# Patient Record
Sex: FEMALE | Race: WHITE | NOT HISPANIC OR LATINO | URBAN - METROPOLITAN AREA
[De-identification: names, ages, dates, MRNs, and addresses within clinical notes are randomized per-mention and may not be internally consistent; named-entity substitution may affect disease eponyms.]

---

## 2017-10-19 ENCOUNTER — APPOINTMENT (RX ONLY)
Dept: URBAN - METROPOLITAN AREA CLINIC 23 | Facility: CLINIC | Age: 47
Setting detail: DERMATOLOGY
End: 2017-10-19

## 2017-10-19 DIAGNOSIS — C84.A CUTANEOUS T-CELL LYMPHOMA, UNSPECIFIED: ICD-10-CM

## 2017-10-19 PROBLEM — L20.84 INTRINSIC (ALLERGIC) ECZEMA: Status: ACTIVE | Noted: 2017-10-19

## 2017-10-19 PROBLEM — L70.0 ACNE VULGARIS: Status: ACTIVE | Noted: 2017-10-19

## 2017-10-19 PROBLEM — K21.9 GASTRO-ESOPHAGEAL REFLUX DISEASE WITHOUT ESOPHAGITIS: Status: ACTIVE | Noted: 2017-10-19

## 2017-10-19 PROBLEM — J30.1 ALLERGIC RHINITIS DUE TO POLLEN: Status: ACTIVE | Noted: 2017-10-19

## 2017-10-19 PROBLEM — L85.3 XEROSIS CUTIS: Status: ACTIVE | Noted: 2017-10-19

## 2017-10-19 PROBLEM — C84.A0 CUTANEOUS T-CELL LYMPHOMA, UNSPECIFIED, UNSPECIFIED SITE: Status: ACTIVE | Noted: 2017-10-19

## 2017-10-19 PROBLEM — E03.9 HYPOTHYROIDISM, UNSPECIFIED: Status: ACTIVE | Noted: 2017-10-19

## 2017-10-19 PROBLEM — L29.8 OTHER PRURITUS: Status: ACTIVE | Noted: 2017-10-19

## 2017-10-19 PROCEDURE — ? COUNSELING

## 2017-10-19 PROCEDURE — ? ADDITIONAL NOTES

## 2017-10-19 PROCEDURE — ? ORDER TESTS

## 2017-10-19 PROCEDURE — ? REFERRAL

## 2017-10-19 PROCEDURE — 99202 OFFICE O/P NEW SF 15 MIN: CPT

## 2017-10-19 PROCEDURE — ? PRESCRIPTION

## 2017-10-19 RX ORDER — HYDROCORTISONE 25 MG/G
CREAM TOPICAL
Qty: 1 | Refills: 0 | Status: ERX | COMMUNITY
Start: 2017-10-19

## 2017-10-19 RX ORDER — FLUOCINONIDE 0.5 MG/G
CREAM TOPICAL
Qty: 1 | Refills: 1 | Status: ERX | COMMUNITY
Start: 2017-10-19

## 2017-10-19 RX ADMIN — FLUOCINONIDE: 0.5 CREAM TOPICAL at 18:04

## 2017-10-19 RX ADMIN — HYDROCORTISONE: 25 CREAM TOPICAL at 18:04

## 2017-10-19 ASSESSMENT — LOCATION SIMPLE DESCRIPTION DERM
LOCATION SIMPLE: LEFT LOWER BACK
LOCATION SIMPLE: ABDOMEN
LOCATION SIMPLE: LEFT THIGH
LOCATION SIMPLE: RIGHT LOWER BACK
LOCATION SIMPLE: RIGHT THIGH
LOCATION SIMPLE: LEFT BREAST
LOCATION SIMPLE: CHEST

## 2017-10-19 ASSESSMENT — LOCATION DETAILED DESCRIPTION DERM
LOCATION DETAILED: LEFT MEDIAL BREAST 11-12:00 REGION
LOCATION DETAILED: RIGHT MEDIAL SUPERIOR CHEST
LOCATION DETAILED: LEFT ANTERIOR PROXIMAL THIGH
LOCATION DETAILED: RIGHT LATERAL ABDOMEN
LOCATION DETAILED: PERIUMBILICAL SKIN
LOCATION DETAILED: RIGHT ANTERIOR PROXIMAL THIGH
LOCATION DETAILED: RIGHT INFERIOR LATERAL MIDBACK
LOCATION DETAILED: LEFT INFERIOR LATERAL MIDBACK

## 2017-10-19 ASSESSMENT — LOCATION ZONE DERM
LOCATION ZONE: TRUNK
LOCATION ZONE: LEG

## 2017-10-19 NOTE — HPI: RASH
How Severe Is Your Rash?: moderate
Is This A New Presentation, Or A Follow-Up?: Rash
Additional History: Diagnosed with lichen planus and T cell lymphoma.

## 2017-10-19 NOTE — PROCEDURE: ORDER TESTS
Billing Type: Third-Party Bill
Bill For Surgical Tray: no
Clinical Notes (To The Lab): Chest XR: recent cutaneous lymphoma diagnosis. Rule out lymphoma elsewhere.\\n\\[id number] (Need peripheral blood smear, rule of Sezary syndrome)

## 2017-10-19 NOTE — PROCEDURE: ADDITIONAL NOTES
Additional Notes: Discussed a referal to oncology co-manage disorder and labs with [facility]. Patient may need T-Cell gene rearrangement. Will order basic labs and chest X-ray. Will start topical steroids as above. If not improved, recommended light therapy 3 times a week x months( in [facility] due to patients home base). TBSA approximately 25%.